# Patient Record
Sex: MALE | Race: WHITE | Employment: STUDENT | URBAN - METROPOLITAN AREA
[De-identification: names, ages, dates, MRNs, and addresses within clinical notes are randomized per-mention and may not be internally consistent; named-entity substitution may affect disease eponyms.]

---

## 2019-07-24 ENCOUNTER — OFFICE VISIT (OUTPATIENT)
Dept: DERMATOLOGY | Facility: CLINIC | Age: 24
End: 2019-07-24
Payer: COMMERCIAL

## 2019-07-24 VITALS — HEART RATE: 74 BPM | DIASTOLIC BLOOD PRESSURE: 74 MMHG | SYSTOLIC BLOOD PRESSURE: 133 MMHG

## 2019-07-24 DIAGNOSIS — D22.9 MULTIPLE MELANOCYTIC NEVI: Primary | ICD-10-CM

## 2019-07-24 ASSESSMENT — PAIN SCALES - GENERAL: PAINLEVEL: NO PAIN (0)

## 2019-07-24 NOTE — LETTER
7/24/2019       RE: Nahum Mclaughlin  [address]  [facility] [address]     Dear Colleague,    Thank you for referring your patient, Nahum Mclaughlin, to the [facility] DERMATOLOGY at [facility]. Please see a copy of my visit note below.    [facility] Dermatology Note      Dermatology Problem List:  1. Clinically banal-appearing melanocytic nevi and solar lentigines. FBSE 7/24/19    Encounter Date: Jul 24, 2019    CC:  Chief Complaint   Patient presents with     Skin Check     Nahum is here today for a skin check. He does not have any area of concern.       History of Present Illness:  Mr. Nahum Mclaughlin is a 24 year old male who presents for a full body skin exam. He reports that he has never had a skin exam and has no areas of concern. He denies any changing moles or moles that catch his attention, any tender spots on his skin, and no lesions that bleed/ooze. He reports some recent pressure from his mother and girlfriend as a friend was recently diagnosed with melanoma. He has no family history of skin cancer.    Mr. Mclaughlin reports that he has a history of peeling sunburns in childhood and recently had a peeling sunburn on his back. He reports he often wears sunscreen with an SPF 30-50 but often forgets to reapply, especially when he is at a lake or on the water. He occasionally wears a hat.     He is otherwise healthy and enjoying the summer doing orthopedics research.     Past Medical History:   History reviewed. No pertinent past medical history.     Past Surgical History:   Procedure Laterality Date     ARTHROSCOPY KNEE       ARTHROSCOPY KNEE WITH MEDIAL MENISCECTOMY  2/14/2014    Procedure: ARTHROSCOPY KNEE WITH MEDIAL MENISCECTOMY;  Right knee arthroscopy and partial medial meniscectomy.;  Surgeon: Jose Mayes MD;  Location:  OR     TONSILLECTOMY       Social History:  Patient reports that he has never smoked. He has never used smokeless  tobacco. He reports that he drinks alcohol. He reports that he does not use drugs. 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    Family History:  No family history of skin cancer.    Medications:  Current Outpatient Medications   Medication Sig Dispense Refill     hydrOXYzine (ATARAX) 25 MG tablet Take 1 tablet (25 mg) by mouth every 6 hours as needed for itching (muscle spasm ) (Patient not taking: Reported on 7/24/2019) 30 tablet 0     oxyCODONE (ROXICODONE) 5 MG immediate release tablet Take 1-2 tablets (5-10 mg) by mouth every 3 hours as needed for pain or other (Moderate to Severe) (Patient not taking: Reported on 7/24/2019) 40 tablet 0     senna-docusate (SENOKOT-S;PERICOLACE) 8.6-50 MG per tablet Take 1-2 tablets by mouth 2 times daily Take while on oral narcotics to prevent or treat constipation. (Patient not taking: Reported on 7/24/2019) 30 tablet 0     No Known Allergies    Review of Systems:  -As per HPI  -Constitutional: Otherwise feeling well today, in usual state of health.  -HEENT: Patient denies nonhealing oral sores.  -Skin: As above in HPI. No additional skin concerns.    Physical exam:  Vitals: /74 (BP Location: Right arm, Patient Position: Sitting, Cuff Size: Adult Regular)   Pulse 74   GEN: This is a well developed, well-nourished male in no acute distress, in a pleasant mood.    SKIN: Total skin excluding the undergarment areas was performed. The exam included the head/face, neck, both arms, chest, back, abdomen, both legs, digits and/or nails.   -Solis Skin Type II/III  -Slight peeling and scale with minimal erythema scattered across posterior trunk  -Few 3-4mm dark round macules with granular pigment network on dermoscopy  -Scattered light tan macules across sun exposed areas and on posterior trunk  -3 mm dark round macule with regular border on 3rd digit of left foot  -No other lesions of concern on areas examined.      Impression/Plan:  1. Benign skin findings -scattered solar lentigines and few true banal-appearing melanocytic nevi, none concerning. We discussed that he should be sure to reapply using a water-resistant, broad spectrum sunscreen while on the water or exposed to sun for more than  minutes. We discussed that his skin shows minimal skins of sun damage, except for a current resolving burn, and that it is appropriate to have follow up skin exams every 2 years or earlier if he notices a spot of concern. He is in agreement with this plan.     No further intervention required at this time.     ABCDs of melanoma were discussed and self skin checks were advised.     Sun precaution was advised including the use of sun screens of SPF 30 or higher, sun protective clothing, and avoidance of tanning beds.    Follow-up in 2 years, earlier for new or changing lesions.     Staff Involved:  I,Yandel Orosco, saw and examined the patient in the presence of Dr. Augilar.    Staff Physician:  I was present with the medical student who participated in the service and in the documentation of the note. I have verified the history and personally performed the physical exam and medical decision making. I edited the assessment and plan of care as documented in the note.     Leonel Aguilar MD   of Dermatology  Department of Dermatology  [facility]

## 2019-07-24 NOTE — NURSING NOTE
Dermatology Rooming Note    Nahum Arnoldo's goals for this visit include:   Chief Complaint   Patient presents with     Skin Check     Nahum is here today for a skin check. He does not have any area of concern.     Marty Gonsalez, [facility]

## 2019-07-24 NOTE — PATIENT INSTRUCTIONS
Follow up in 2 years, earlier if new or changing spots.    SUN PROTECTION    WHY PROTECT AGAINST THE SUN?  In the past, sun exposure was thought to be a healthy benefit of outdoor activity. However, studies have shown many unhealthy effects of sun exposure, such as early aging of the skin and skin cancer.    WHAT KIND OF DAMAGE DOES THE SUN EXPOSURE CAUSE?  Part of the sun s energy that reaches earth is composed of rays of invisible ultraviolet (UV) light. When ultraviolet light rays (UVA and UVB) enter the skin, they damage skin cells, causing visible and invisible injuries.    Sunburn is a visible type of damage, which appears just a few hours after sun exposure. In many people this type of damage also causes tanning. Freckles, which occur in people with fair skin, are usually due to sun exposure. Freckles are nearly always a sign that sun damage has occurred, and therefore show the need for sun protection.    Ultraviolet light rays also cause invisible damage to skin cells. Some of the injury is repaired but some of the cell damage adds up year after year. After 20-30 years or more, the built-up damage appears as wrinkles, age spots and even skin cancer.  Although window glass blocks UVB light, UVA rays are able to penetrate through the glass.    HOW CAN I PROTECT MYSELF FROM EXCESSIVE SUN EXPOSURE?  1. Avoidance. Plan your activities to avoid being in the sun in the middle of the day. Sun exposure is more intense closer to the equator, in the mountains and in the summer. The sun s damaging effects are increased by reflection from water, white sand and snow. Avoid long periods of direct sun exposure. Sit or play in the shade, especially when your shadow is shorter then you are tall. Stay out of the sun during peak hours of 10 am - 2 pm.   2. Use protective clothing.  Cover up with light colored clothing when outdoors including a hat to protect the scalp and face. In addition to filtering out the sun, tightly  woven clothing reflects heat and helps keep you feeling cool. Sunglasses that block ultraviolet rays protect the eyes and eyelids. Multiple retailers now sell clothing and swimwear for adults and children that is made of special fabric that protects against the sun.    3. Apply a broad-spectrum UVA and UVB sunscreen with an SPF of 30 of higher and reapply approximately every two hours, even on cloudy days. If swimming or participating in intense physical activity, sunscreen may need to be applied more often.     IS SUNSCREEN SAFE?  Hats, clothing and shade are the most reliable forms of sun protection, but sunscreen is also an important part of protecting your child from the sun. Some have raised concerns about chemical sunscreens and the dangers of absorption. Most of this concern is theoretical, and our providers would be happy to discuss this with you.  Most dermatologists agree that the risk of unprotected sun exposure far outweighs the theoretical risks of sunscreens.      Aveeno Active Natural Protection Mineral Block Lotion SPF 30  Aveeno Baby Natural Protection Face Stick SPF 50+  Banana Boat Natural Reflect (baby or kids) SPF 50+  Bare Republic SPR 50 Stick   Beauty Countersun Mineral Sunscreen Stick SPF 30  Gordon s Bees Chemical-Free Sunscreen SPF 30  Blue Lizard Baby SPF 30+  Blue Lizard for Sensitive Skin SPF 30+  Cotz Pediatric Pure SPF 30  Cotz Pediatric Face SPF 40  Cotz 20% Zinc SPF 35  CVS Sensitive Skin 30  CVS Baby Lotion Sunscreen SPF 60+  EltaMD UV Physical Broad-Spectrum SPF 41  La Roche-Posay Anthelios Mineral Zinc Oxide Sunscreen SPF 50  Mustella Broad Spectrum SPF 50+/Mineral Sunscreen Stick  Neutrogena Sensitive Skin- Pure and Free Baby SPF 30  Neutrogena Sensitive Skin-Pure and Free Baby  SPF 50+  Neutrogena Sheer Zinc Oxide Dry-Touch Face Sunscreen with Broad Spectrum SPF 50, Oil-Free, Non-Comedogenic & Non-Greasy Mineral Sunscreen  Thinkbaby Safe Sunscreen SPF 50+,   Thinksport Sunscreen  SPF 50+,   PreSun Sensitive Sunblock SPF 28  Vanream Sunscreen for Sensitive Skin SPF 30 or 50  [employer]green s Sensitive Skin SPF 70    WHERE CAN I BUY SUN PROTECTIVE CLOTHING AND SWIMWEAR?   Many retailers sell these products.  Coolibar, Solumbra, Sunday Afternoons, and Athleta are some examples.  Many other popular children s brands have started selling UV protective swimwear, and we recommend swimsuits that include swim shirts and don t leave extra skin exposed.   UV protective products can also be washed into clothing (eg: Rit Sun Guard Laundry UV Protectant).     Recognizing Skin Cancer  Doing monthly skin checkups is the best way to find new marks or skin changes. During your skin checkups, be sure to follow the ABCDEs of skin checks. This means checking moles or other growths for Asymmetry, Border, Color, Diameter, and Evolving (changing). Note, too, any new growths, or, if any of your growths bleed, itch, look different, or are painful.  The ABCDEs of skin checks  Check your moles or growths for signs of melanoma using ABCDE:    Asymmetry: the sides of the mole or growth don t match    Border: the edges are ragged, notched, or blurred    Color: the color within the mole or growth varies    Diameter: the mole or growth is larger than 6 mm (size of a pencil eraser)    Evolving: the size, shape, or color of the mole or growth is changing (evolving is not shown below)       In addition to the ABCDEs, other warning signs of skin cancer include:    A spot or mole that looks different from all other marks on your skin    Changes in how an area feels, such as itching, tenderness, or pain    Changes in the skin's surface, such as oozing, bleeding, or scaliness    A sore that does not heal    New swelling or redness beyond the border of a mole  Who s at risk?  Anyone can get skin cancer. But you are at greater risk if you have:    Fair skin, light-colored hair, or light-colored eyes    Many moles or abnormal moles on  your skin    A history of sunburns from sunlight or tanning beds    A family history of skin cancer    A history of exposure to radiation or chemicals    A weakened immune system  Also, a personal history of skin cancer puts you at risk for recurring skin cancer.  How to check your skin  Do your monthly skin checkups in front of a full-length mirror. Check all parts of your body, including your:    Head (ears, face, neck, and scalp)    Torso (front, back, and sides)    Arms (tops, undersides, upper, and lower armpits)    Hands (palms, backs, and fingers, including under the nails)    Buttocks and genitals    Legs (front, back, and sides)    Feet (tops, soles, toes, including under the nails, and between toes)  If you have a lot of moles, take digital photos of them each month. Make sure to take photos both up close and from a distance. These can help you see if any moles change over time.  When to seek medical treatment  Most skin changes are not cancer. But if you see any changes in your skin, call your doctor right away. Only he or she can diagnose a problem. If you have skin cancer, seeing your doctor can be the first step toward getting the treatment that could save your life.   Date Last Reviewed: 1/1/2017 2000-2018 [employer]. [address] [place] [address], [place] [address]. All rights reserved. This information is not intended as a substitute for professional medical care. Always follow your healthcare professional's instructions.

## 2019-07-24 NOTE — PROGRESS NOTES
[facility] Dermatology Note      Dermatology Problem List:  1. Clinically banal-appearing melanocytic nevi and solar lentigines. FBSE 7/24/19    Encounter Date: Jul 24, 2019    CC:  Chief Complaint   Patient presents with     Skin Check     Nahum is here today for a skin check. He does not have any area of concern.       History of Present Illness:  Mr. Nahum Mclaughlin is a 24 year old male who presents for a full body skin exam. He reports that he has never had a skin exam and has no areas of concern. He denies any changing moles or moles that catch his attention, any tender spots on his skin, and no lesions that bleed/ooze. He reports some recent pressure from his mother and girlfriend as a friend was recently diagnosed with melanoma. He has no family history of skin cancer.    Mr. Mclaughlin reports that he has a history of peeling sunburns in childhood and recently had a peeling sunburn on his back. He reports he often wears sunscreen with an SPF 30-50 but often forgets to reapply, especially when he is at a lake or on the water. He occasionally wears a hat.     He is otherwise healthy and enjoying the summer doing orthopedics research.     Past Medical History:   History reviewed. No pertinent past medical history.     Past Surgical History:   Procedure Laterality Date     ARTHROSCOPY KNEE       ARTHROSCOPY KNEE WITH MEDIAL MENISCECTOMY  2/14/2014    Procedure: ARTHROSCOPY KNEE WITH MEDIAL MENISCECTOMY;  Right knee arthroscopy and partial medial meniscectomy.;  Surgeon: Jose Mayes MD;  Location:  OR     TONSILLECTOMY       Social History:  Patient reports that he has never smoked. He has never used smokeless tobacco. He reports that he drinks alcohol. He reports that he does not use drugs. 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    Family History:  No family history of skin cancer.    Medications:  Current Outpatient Medications    Medication Sig Dispense Refill     hydrOXYzine (ATARAX) 25 MG tablet Take 1 tablet (25 mg) by mouth every 6 hours as needed for itching (muscle spasm ) (Patient not taking: Reported on 7/24/2019) 30 tablet 0     oxyCODONE (ROXICODONE) 5 MG immediate release tablet Take 1-2 tablets (5-10 mg) by mouth every 3 hours as needed for pain or other (Moderate to Severe) (Patient not taking: Reported on 7/24/2019) 40 tablet 0     senna-docusate (SENOKOT-S;PERICOLACE) 8.6-50 MG per tablet Take 1-2 tablets by mouth 2 times daily Take while on oral narcotics to prevent or treat constipation. (Patient not taking: Reported on 7/24/2019) 30 tablet 0     No Known Allergies    Review of Systems:  -As per HPI  -Constitutional: Otherwise feeling well today, in usual state of health.  -HEENT: Patient denies nonhealing oral sores.  -Skin: As above in HPI. No additional skin concerns.    Physical exam:  Vitals: /74 (BP Location: Right arm, Patient Position: Sitting, Cuff Size: Adult Regular)   Pulse 74   GEN: This is a well developed, well-nourished male in no acute distress, in a pleasant mood.    SKIN: Total skin excluding the undergarment areas was performed. The exam included the head/face, neck, both arms, chest, back, abdomen, both legs, digits and/or nails.   -Solis Skin Type II/III  -Slight peeling and scale with minimal erythema scattered across posterior trunk  -Few 3-4mm dark round macules with granular pigment network on dermoscopy  -Scattered light tan macules across sun exposed areas and on posterior trunk  -3 mm dark round macule with regular border on 3rd digit of left foot  -No other lesions of concern on areas examined.     Impression/Plan:  1. Benign skin findings -scattered solar lentigines and few true banal-appearing melanocytic nevi, none concerning. We discussed that he should be sure to reapply using a water-resistant, broad spectrum sunscreen while on the water or exposed to sun for more than   minutes. We discussed that his skin shows minimal skins of sun damage, except for a current resolving burn, and that it is appropriate to have follow up skin exams every 2 years or earlier if he notices a spot of concern. He is in agreement with this plan.     No further intervention required at this time.     ABCDs of melanoma were discussed and self skin checks were advised.     Sun precaution was advised including the use of sun screens of SPF 30 or higher, sun protective clothing, and avoidance of tanning beds.    Follow-up in 2 years, earlier for new or changing lesions.     Staff Involved:  I,Yandel Orosco, saw and examined the patient in the presence of Dr. Aguilar.    Staff Physician:  I was present with the medical student who participated in the service and in the documentation of the note. I have verified the history and personally performed the physical exam and medical decision making. I edited the assessment and plan of care as documented in the note.     Leonel Aguilar MD   of Dermatology  Department of Dermatology  [facility]

## 2020-03-01 ENCOUNTER — HEALTH MAINTENANCE LETTER (OUTPATIENT)
Age: 25
End: 2020-03-01

## 2020-12-14 ENCOUNTER — HEALTH MAINTENANCE LETTER (OUTPATIENT)
Age: 25
End: 2020-12-14

## 2021-04-17 ENCOUNTER — HEALTH MAINTENANCE LETTER (OUTPATIENT)
Age: 26
End: 2021-04-17

## 2021-10-02 ENCOUNTER — HEALTH MAINTENANCE LETTER (OUTPATIENT)
Age: 26
End: 2021-10-02

## 2022-05-14 ENCOUNTER — HEALTH MAINTENANCE LETTER (OUTPATIENT)
Age: 27
End: 2022-05-14

## 2022-09-03 ENCOUNTER — HEALTH MAINTENANCE LETTER (OUTPATIENT)
Age: 27
End: 2022-09-03

## 2023-06-02 ENCOUNTER — HEALTH MAINTENANCE LETTER (OUTPATIENT)
Age: 28
End: 2023-06-02